# Patient Record
Sex: MALE | Race: WHITE | NOT HISPANIC OR LATINO
[De-identification: names, ages, dates, MRNs, and addresses within clinical notes are randomized per-mention and may not be internally consistent; named-entity substitution may affect disease eponyms.]

---

## 2017-07-03 PROBLEM — Z00.00 ENCOUNTER FOR PREVENTIVE HEALTH EXAMINATION: Status: ACTIVE | Noted: 2017-07-03

## 2017-07-06 ENCOUNTER — APPOINTMENT (OUTPATIENT)
Dept: NEUROSURGERY | Facility: CLINIC | Age: 52
End: 2017-07-06

## 2017-07-06 VITALS
BODY MASS INDEX: 27.2 KG/M2 | DIASTOLIC BLOOD PRESSURE: 60 MMHG | SYSTOLIC BLOOD PRESSURE: 130 MMHG | HEIGHT: 70 IN | WEIGHT: 190 LBS

## 2017-07-06 DIAGNOSIS — M21.371 FOOT DROP, RIGHT FOOT: ICD-10-CM

## 2017-07-06 DIAGNOSIS — M51.36 OTHER INTERVERTEBRAL DISC DEGENERATION, LUMBAR REGION: ICD-10-CM

## 2018-02-14 ENCOUNTER — OUTPATIENT (OUTPATIENT)
Dept: OUTPATIENT SERVICES | Facility: HOSPITAL | Age: 53
LOS: 1 days | End: 2018-02-14

## 2018-04-04 ENCOUNTER — OUTPATIENT (OUTPATIENT)
Dept: OUTPATIENT SERVICES | Facility: HOSPITAL | Age: 53
LOS: 1 days | End: 2018-04-04

## 2018-10-30 ENCOUNTER — OUTPATIENT (OUTPATIENT)
Dept: OUTPATIENT SERVICES | Facility: HOSPITAL | Age: 53
LOS: 1 days | End: 2018-10-30

## 2020-03-20 ENCOUNTER — APPOINTMENT (OUTPATIENT)
Dept: ULTRASOUND IMAGING | Facility: CLINIC | Age: 55
End: 2020-03-20
Payer: COMMERCIAL

## 2020-03-20 PROCEDURE — 76770 US EXAM ABDO BACK WALL COMP: CPT

## 2020-06-17 ENCOUNTER — OUTPATIENT (OUTPATIENT)
Dept: OUTPATIENT SERVICES | Facility: HOSPITAL | Age: 55
LOS: 1 days | End: 2020-06-17

## 2020-06-21 ENCOUNTER — APPOINTMENT (OUTPATIENT)
Dept: DISASTER EMERGENCY | Facility: CLINIC | Age: 55
End: 2020-06-21

## 2020-06-21 DIAGNOSIS — Z01.818 ENCOUNTER FOR OTHER PREPROCEDURAL EXAMINATION: ICD-10-CM

## 2020-06-22 ENCOUNTER — APPOINTMENT (OUTPATIENT)
Dept: DISASTER EMERGENCY | Facility: CLINIC | Age: 55
End: 2020-06-22

## 2020-06-23 LAB — SARS-COV-2 N GENE NPH QL NAA+PROBE: NOT DETECTED

## 2020-06-24 ENCOUNTER — OUTPATIENT (OUTPATIENT)
Dept: OUTPATIENT SERVICES | Facility: HOSPITAL | Age: 55
LOS: 1 days | End: 2020-06-24

## 2021-03-15 ENCOUNTER — APPOINTMENT (OUTPATIENT)
Dept: MRI IMAGING | Facility: HOSPITAL | Age: 56
End: 2021-03-15

## 2021-04-12 ENCOUNTER — APPOINTMENT (OUTPATIENT)
Dept: ULTRASOUND IMAGING | Facility: CLINIC | Age: 56
End: 2021-04-12
Payer: COMMERCIAL

## 2021-04-12 ENCOUNTER — APPOINTMENT (OUTPATIENT)
Dept: MRI IMAGING | Facility: CLINIC | Age: 56
End: 2021-04-12
Payer: COMMERCIAL

## 2021-04-12 ENCOUNTER — OUTPATIENT (OUTPATIENT)
Dept: OUTPATIENT SERVICES | Facility: HOSPITAL | Age: 56
LOS: 1 days | End: 2021-04-12

## 2021-04-12 DIAGNOSIS — M16.11 UNILATERAL PRIMARY OSTEOARTHRITIS, RIGHT HIP: ICD-10-CM

## 2021-04-12 PROCEDURE — 76942 ECHO GUIDE FOR BIOPSY: CPT | Mod: 26

## 2021-04-12 PROCEDURE — 73722 MRI JOINT OF LWR EXTR W/DYE: CPT | Mod: 26,RT

## 2021-04-12 PROCEDURE — 27093 INJECTION FOR HIP X-RAY: CPT | Mod: RT

## 2021-06-08 ENCOUNTER — OUTPATIENT (OUTPATIENT)
Dept: OUTPATIENT SERVICES | Facility: HOSPITAL | Age: 56
LOS: 1 days | Discharge: HOME | End: 2021-06-08
Payer: COMMERCIAL

## 2021-06-08 DIAGNOSIS — S73.191A OTHER SPRAIN OF RIGHT HIP, INITIAL ENCOUNTER: ICD-10-CM

## 2021-06-08 DIAGNOSIS — Z98.890 OTHER SPECIFIED POSTPROCEDURAL STATES: Chronic | ICD-10-CM

## 2021-06-08 DIAGNOSIS — Z87.81 PERSONAL HISTORY OF (HEALED) TRAUMATIC FRACTURE: Chronic | ICD-10-CM

## 2021-06-08 DIAGNOSIS — Z01.818 ENCOUNTER FOR OTHER PREPROCEDURAL EXAMINATION: ICD-10-CM

## 2021-06-08 LAB
ANION GAP SERPL CALC-SCNC: 6 MMOL/L — SIGNIFICANT CHANGE UP (ref 5–17)
BUN SERPL-MCNC: 16 MG/DL — SIGNIFICANT CHANGE UP (ref 7–23)
CALCIUM SERPL-MCNC: 9 MG/DL — SIGNIFICANT CHANGE UP (ref 8.5–10.1)
CHLORIDE SERPL-SCNC: 108 MMOL/L — SIGNIFICANT CHANGE UP (ref 96–108)
CO2 SERPL-SCNC: 26 MMOL/L — SIGNIFICANT CHANGE UP (ref 22–31)
CREAT SERPL-MCNC: 0.83 MG/DL — SIGNIFICANT CHANGE UP (ref 0.5–1.3)
GLUCOSE SERPL-MCNC: 111 MG/DL — HIGH (ref 70–99)
HCT VFR BLD CALC: 42.8 % — SIGNIFICANT CHANGE UP (ref 39–50)
HGB BLD-MCNC: 14.2 G/DL — SIGNIFICANT CHANGE UP (ref 13–17)
MCHC RBC-ENTMCNC: 30.6 PG — SIGNIFICANT CHANGE UP (ref 27–34)
MCHC RBC-ENTMCNC: 33.2 GM/DL — SIGNIFICANT CHANGE UP (ref 32–36)
MCV RBC AUTO: 92.2 FL — SIGNIFICANT CHANGE UP (ref 80–100)
NRBC # BLD: 0 /100 WBCS — SIGNIFICANT CHANGE UP (ref 0–0)
PLATELET # BLD AUTO: 452 K/UL — HIGH (ref 150–400)
POTASSIUM SERPL-MCNC: 4.4 MMOL/L — SIGNIFICANT CHANGE UP (ref 3.5–5.3)
POTASSIUM SERPL-SCNC: 4.4 MMOL/L — SIGNIFICANT CHANGE UP (ref 3.5–5.3)
RBC # BLD: 4.64 M/UL — SIGNIFICANT CHANGE UP (ref 4.2–5.8)
RBC # FLD: 13.1 % — SIGNIFICANT CHANGE UP (ref 10.3–14.5)
SODIUM SERPL-SCNC: 140 MMOL/L — SIGNIFICANT CHANGE UP (ref 135–145)
WBC # BLD: 7.14 K/UL — SIGNIFICANT CHANGE UP (ref 3.8–10.5)
WBC # FLD AUTO: 7.14 K/UL — SIGNIFICANT CHANGE UP (ref 3.8–10.5)

## 2021-06-08 PROCEDURE — 93010 ELECTROCARDIOGRAM REPORT: CPT

## 2021-06-08 NOTE — H&P PST ADULT - NSICDXPROBLEM_GEN_ALL_CORE_FT
PROBLEM DIAGNOSES  Problem: Other sprain of right hip, initial encounter  Assessment and Plan: RIght hip arthrosocpy with labral repair vs reconstruction with image

## 2021-06-08 NOTE — H&P PST ADULT - HISTORY OF PRESENT ILLNESS
55 year old male with a past medical history of ulcerative colitis GERD, UBALDO (on CPAP), and chronic back limited ROM and pain to right groin and hip.  He is scheduled for a right hip arthroscopy with labral repair vs reconstruction with image on 6/17/2021.    He denies fever, cough, SOB, recent travels and sick contacts.

## 2021-06-08 NOTE — H&P PST ADULT - ASSESSMENT
55 year old male with a past medical history of ulcerative colitis GERD, UBALDO (on CPAP), and chronic back limited ROM and pain to right groin and hip.  He is scheduled for a right hip arthroscopy with labral repair vs reconstruction with image on 2021.    CAPRINI SCORE [CLOT]    AGE RELATED RISK FACTORS                                                       MOBILITY RELATED FACTORS  [x ] Age 41-60 years                                            (1 Point)                  [ ] Bed rest                                                        (1 Point)  [ ] Age: 61-74 years                                           (2 Points)                 [ ] Plaster cast                                                   (2 Points)  [ ] Age= 75 years                                              (3 Points)                 [ ] Bed bound for more than 72 hours                 (2 Points)    DISEASE RELATED RISK FACTORS                                               GENDER SPECIFIC FACTORS  [ ] Edema in the lower extremities                       (1 Point)                  [ ] Pregnancy                                                     (1 Point)  [ ] Varicose veins                                               (1 Point)                  [ ] Post-partum < 6 weeks                                   (1 Point)             [ x] BMI > 25 Kg/m2                                            (1 Point)                  [ ] Hormonal therapy  or oral contraception          (1 Point)                 [ ] Sepsis (in the previous month)                        (1 Point)                  [ ] History of pregnancy complications                 (1 point)  [ ] Pneumonia or serious lung disease                                               [ ] Unexplained or recurrent                     (1 Point)           (in the previous month)                               (1 Point)  [ ] Abnormal pulmonary function test                     (1 Point)                 SURGERY RELATED RISK FACTORS  [ ] Acute myocardial infarction                              (1 Point)                 [ ]  Section                                             (1 Point)  [ ] Congestive heart failure (in the previous month)  (1 Point)               [ ] Minor surgery                                                  (1 Point)   [ ] Inflammatory bowel disease                             (1 Point)                 [ ] Arthroscopic surgery                                        (2 Points)  [ ] Central venous access                                      (2 Points)                [x ] General surgery lasting more than 45 minutes   (2 Points)       [ ] Stroke (in the previous month)                          (5 Points)               [ ] Elective arthroplasty                                         (5 Points)                                                                                                                                               HEMATOLOGY RELATED FACTORS                                                 TRAUMA RELATED RISK FACTORS  [ ] Prior episodes of VTE                                     (3 Points)                [ ] Fracture of the hip, pelvis, or leg                       (5 Points)  [ ] Positive family history for VTE                         (3 Points)                 [ ] Acute spinal cord injury (in the previous month)  (5 Points)  [ ] Prothrombin 28584 A                                     (3 Points)                 [ ] Paralysis  (less than 1 month)                             (5 Points)  [ ] Factor V Leiden                                             (3 Points)                  [ ] Multiple Trauma within 1 month                        (5 Points)  [ ] Lupus anticoagulants                                     (3 Points)                                                           [ ] Anticardiolipin antibodies                               (3 Points)                                                       [ ] High homocysteine in the blood                      (3 Points)                                             [ ] Other congenital or acquired thrombophilia      (3 Points)                                                [ ] Heparin induced thrombocytopenia                  (3 Points)                                          Total Score [     4     ]    Caprini Score 0 - 2:  Low Risk, No VTE Prophylaxis required for most patients, encourage ambulation  Caprini Score 3 - 6:  At Risk, pharmacologic VTE prophylaxis is indicated for most patients (in the absence of a contraindication)  Caprini Score Greater than or = 7:  High Risk, pharmacologic VTE prophylaxis is indicated for most patients (in the absence of a contraindication)

## 2021-06-08 NOTE — H&P PST ADULT - NSICDXPASTMEDICALHX_GEN_ALL_CORE_FT
PAST MEDICAL HISTORY:  Chronic back pain     GERD (gastroesophageal reflux disease)     UBALDO on CPAP     Ulcerative colitis

## 2021-06-14 ENCOUNTER — APPOINTMENT (OUTPATIENT)
Dept: DISASTER EMERGENCY | Facility: CLINIC | Age: 56
End: 2021-06-14

## 2021-06-23 ENCOUNTER — TRANSCRIPTION ENCOUNTER (OUTPATIENT)
Age: 56
End: 2021-06-23

## 2021-06-24 ENCOUNTER — OUTPATIENT (OUTPATIENT)
Dept: OUTPATIENT SERVICES | Facility: HOSPITAL | Age: 56
LOS: 1 days | Discharge: HOME | End: 2021-06-24

## 2021-06-24 VITALS
RESPIRATION RATE: 17 BRPM | DIASTOLIC BLOOD PRESSURE: 87 MMHG | OXYGEN SATURATION: 98 % | TEMPERATURE: 98 F | WEIGHT: 205.03 LBS | HEIGHT: 70 IN | HEART RATE: 74 BPM | SYSTOLIC BLOOD PRESSURE: 142 MMHG

## 2021-06-24 VITALS
RESPIRATION RATE: 20 BRPM | OXYGEN SATURATION: 97 % | SYSTOLIC BLOOD PRESSURE: 149 MMHG | DIASTOLIC BLOOD PRESSURE: 89 MMHG | HEART RATE: 95 BPM

## 2021-06-24 DIAGNOSIS — Z98.890 OTHER SPECIFIED POSTPROCEDURAL STATES: Chronic | ICD-10-CM

## 2021-06-24 DIAGNOSIS — Z87.81 PERSONAL HISTORY OF (HEALED) TRAUMATIC FRACTURE: Chronic | ICD-10-CM

## 2021-06-24 RX ORDER — TRAMADOL HYDROCHLORIDE 50 MG/1
1 TABLET ORAL
Qty: 0 | Refills: 0 | DISCHARGE

## 2021-06-24 RX ORDER — HYDROMORPHONE HYDROCHLORIDE 2 MG/ML
0.5 INJECTION INTRAMUSCULAR; INTRAVENOUS; SUBCUTANEOUS
Refills: 0 | Status: DISCONTINUED | OUTPATIENT
Start: 2021-06-24 | End: 2021-06-24

## 2021-06-24 RX ORDER — TIZANIDINE 4 MG/1
0 TABLET ORAL
Qty: 0 | Refills: 0 | DISCHARGE

## 2021-06-24 RX ORDER — METOCLOPRAMIDE HCL 10 MG
10 TABLET ORAL ONCE
Refills: 0 | Status: DISCONTINUED | OUTPATIENT
Start: 2021-06-24 | End: 2021-06-24

## 2021-06-24 RX ORDER — MESALAMINE 400 MG
2 TABLET, DELAYED RELEASE (ENTERIC COATED) ORAL
Qty: 0 | Refills: 0 | DISCHARGE

## 2021-06-24 RX ORDER — FENTANYL CITRATE 50 UG/ML
25 INJECTION INTRAVENOUS
Refills: 0 | Status: DISCONTINUED | OUTPATIENT
Start: 2021-06-24 | End: 2021-06-24

## 2021-06-24 RX ORDER — SODIUM CHLORIDE 9 MG/ML
1000 INJECTION, SOLUTION INTRAVENOUS
Refills: 0 | Status: DISCONTINUED | OUTPATIENT
Start: 2021-06-24 | End: 2021-06-24

## 2021-06-24 RX ORDER — KETOROLAC TROMETHAMINE 30 MG/ML
30 SYRINGE (ML) INJECTION ONCE
Refills: 0 | Status: DISCONTINUED | OUTPATIENT
Start: 2021-06-24 | End: 2021-06-24

## 2021-06-24 RX ORDER — SODIUM CHLORIDE 9 MG/ML
3 INJECTION INTRAMUSCULAR; INTRAVENOUS; SUBCUTANEOUS EVERY 8 HOURS
Refills: 0 | Status: DISCONTINUED | OUTPATIENT
Start: 2021-06-24 | End: 2021-06-24

## 2021-06-24 RX ORDER — OMEPRAZOLE 10 MG/1
1 CAPSULE, DELAYED RELEASE ORAL
Qty: 0 | Refills: 0 | DISCHARGE

## 2021-06-24 RX ADMIN — HYDROMORPHONE HYDROCHLORIDE 0.5 MILLIGRAM(S): 2 INJECTION INTRAMUSCULAR; INTRAVENOUS; SUBCUTANEOUS at 10:59

## 2021-06-24 RX ADMIN — Medication 30 MILLIGRAM(S): at 10:40

## 2021-06-24 RX ADMIN — SODIUM CHLORIDE 100 MILLILITER(S): 9 INJECTION, SOLUTION INTRAVENOUS at 10:40

## 2021-06-24 RX ADMIN — SODIUM CHLORIDE 3 MILLILITER(S): 9 INJECTION INTRAMUSCULAR; INTRAVENOUS; SUBCUTANEOUS at 07:33

## 2021-06-24 RX ADMIN — HYDROMORPHONE HYDROCHLORIDE 0.5 MILLIGRAM(S): 2 INJECTION INTRAMUSCULAR; INTRAVENOUS; SUBCUTANEOUS at 12:21

## 2021-06-24 RX ADMIN — HYDROMORPHONE HYDROCHLORIDE 0.5 MILLIGRAM(S): 2 INJECTION INTRAMUSCULAR; INTRAVENOUS; SUBCUTANEOUS at 10:57

## 2021-06-24 RX ADMIN — Medication 30 MILLIGRAM(S): at 12:21

## 2021-06-24 RX ADMIN — HYDROMORPHONE HYDROCHLORIDE 0.5 MILLIGRAM(S): 2 INJECTION INTRAMUSCULAR; INTRAVENOUS; SUBCUTANEOUS at 10:40

## 2021-06-24 NOTE — ASU DISCHARGE PLAN (ADULT/PEDIATRIC) - ASU DC SPECIAL INSTRUCTIONSFT
Flat foot weight bearing with crutches.  Begin PT.  Remove dressings in 48 hours, shower then pat dry and apply band aids.  No baths.  Follow up with Dr. Collins in 7-10 days.

## 2021-06-24 NOTE — ASU PREOP CHECKLIST - BMI (KG/M2)
Anesthesia ROS/Med Hx    Overall Review:  Pts. EKG was reviewed, Pts.echo was reviewed and Pts.stress test was reviewed       Anesthesia Plan     ASA Status: 3  Anesthesia Type: General  Induction: Intravenous  Preferred Airway Type: ETT  Reviewed: Lab Results, Pre-Induction Reassessment, Medications, EKG, Patient Summary, Problem List, Chest X-Rays, Beta Blocker Status, Past Med History, NPO Status and Allergies  The proposed anesthetic plan, including its risks and benefits, have been discussed with the Patient - along with the risks and benefits of alternatives.  Questions were encouraged and answered and the patient and/or representative agrees to proceed.  Blood Products: Not Anticipated      Physical Exam  Mallampati: II  TM Distance: >3 FB  Neck ROM: Full  cardiovascular exam normal  pulmonary exam normal  Patient Demonstrates:  Obese                  
29.4

## 2021-06-24 NOTE — ASU PATIENT PROFILE, ADULT - PMH
Chronic back pain    GERD (gastroesophageal reflux disease)    UBALDO on CPAP    Ulcerative colitis

## 2021-06-29 DIAGNOSIS — G47.33 OBSTRUCTIVE SLEEP APNEA (ADULT) (PEDIATRIC): ICD-10-CM

## 2021-06-29 DIAGNOSIS — M24.151 OTHER ARTICULAR CARTILAGE DISORDERS, RIGHT HIP: ICD-10-CM

## 2021-06-29 DIAGNOSIS — F43.9 REACTION TO SEVERE STRESS, UNSPECIFIED: ICD-10-CM

## 2021-06-29 DIAGNOSIS — M25.851 OTHER SPECIFIED JOINT DISORDERS, RIGHT HIP: ICD-10-CM

## 2021-06-29 DIAGNOSIS — E78.5 HYPERLIPIDEMIA, UNSPECIFIED: ICD-10-CM

## 2022-03-27 PROBLEM — K51.90 ULCERATIVE COLITIS, UNSPECIFIED, WITHOUT COMPLICATIONS: Chronic | Status: ACTIVE | Noted: 2021-06-08

## 2022-03-27 PROBLEM — K21.9 GASTRO-ESOPHAGEAL REFLUX DISEASE WITHOUT ESOPHAGITIS: Chronic | Status: ACTIVE | Noted: 2021-06-08

## 2022-03-27 PROBLEM — G47.33 OBSTRUCTIVE SLEEP APNEA (ADULT) (PEDIATRIC): Chronic | Status: ACTIVE | Noted: 2021-06-08

## 2022-03-27 PROBLEM — M54.9 DORSALGIA, UNSPECIFIED: Chronic | Status: ACTIVE | Noted: 2021-06-08

## 2022-05-02 ENCOUNTER — APPOINTMENT (OUTPATIENT)
Dept: ORTHOPEDIC SURGERY | Facility: CLINIC | Age: 57
End: 2022-05-02
Payer: COMMERCIAL

## 2022-05-02 VITALS — HEIGHT: 70 IN | BODY MASS INDEX: 27.2 KG/M2 | WEIGHT: 190 LBS

## 2022-05-02 DIAGNOSIS — Z78.9 OTHER SPECIFIED HEALTH STATUS: ICD-10-CM

## 2022-05-02 PROCEDURE — 99214 OFFICE O/P EST MOD 30 MIN: CPT

## 2022-05-02 NOTE — ASSESSMENT
[FreeTextEntry1] : Previous doc:\par Mild OA right hip with large labral tear. 2 days relief from hip inj by pain management and has done long course of PT and NSAIDs. MRA right hip eval severity of OA and see if he is a hip arthroscopy candidate.\par 5/7/21: MRA reviewed - large labral tear, min OA. Concerned about possible Degen quality of labral tissue. Discussed options - he feels the pain is significant enough daily that he cannot live this way and would like to plan for right hip arthroscopy for repair vs recon. Also discussed VENU but not enough OA for this and I feel he could benefit from arthroscopy - he understands chances of failure and may ultimately still need VENU in the future.\par 7/12/21: I was able to do labral repair as well as a small femoralplasty as he had some mild degeneration - Overall he looks good and is progressing at this time\par 8/16 doing well mild pain - doing alto and has apin but still some weakness in the flexors will start with Increasing PT -\par 9/20 doing well less pain - finished up PT - cont with HEP - mild groin pain but min impingement findings\par 11/15/21: 4 months postop min pain - cont HEP, gradual return to activity.\par \par 5/2/22: Recurrence of pain after jogging - no obvious recurrent tear seen on MRI, has mild OA.  Recc hip inj and then restart PT.

## 2022-05-02 NOTE — DISCUSSION/SUMMARY
[de-identified] : We discussed hip injection and its diagnostic and therapeutic benefits.  In the operating room I will inject a combination of Depomedrol and Marcaine.  The patient will keep a log of their pain relief after the injection.  We will discuss the benefit at a one to two weeks follow-up.  Patient made aware that they may be referred to pain management for this injection\par

## 2022-05-02 NOTE — HISTORY OF PRESENT ILLNESS
[Gradual] : gradual [3] : 3 [2] : 2 [Dull/Aching] : dull/aching [Full time] : Work status: full time [de-identified] : 5/2/22: Worsening pain after trying to jog in March, was doing well prior to this.  Groin pain similar to preop.  Had new MRI done ordered by pain management.  No relief with celebrex.\par \par Previous doc:\par Right > left groin pain x 1 year. Found to have labral tear and inguinal hernia - had hernia repair in June 2020 without relief of groin pain. No improvement with PT and NSAIDs. Now left hip pain is worsening lately but right is worse. Takes tramadol for chronic back pain. Hip inj by pain management gave 2 days of good relief.\par 5/7/21: F/U MRA right hip. Cont groin pain.\par 7/12/21: 2.5 weeks s/p left hip scope. Patient is doing well and this time - Patient is doing well and has seen some progress- Pain is minimal at this time\par 8/16/21 7 wks s/p right hip scope - he is better than prior ot surgery - doing PT happy feel he cannot push it to far at this point \par 9/20/21: 3 months s/p right hip arthroscope. Taking tylneol as needed. Request renewel on Celebrex as otc nsaids are infective at prevent abdominal pain. PT report groin to posterior buttock pain with sudden motion.\par 11/15/21 ~4 months s/p right hip arthroscopy, progressing since last visit. Completed PT. [] : no [FreeTextEntry1] : right hip  [FreeTextEntry5] : Patient started jogging for about 3-4 days and started feeling pain on the right hip  [de-identified] : activity [de-identified] : None

## 2022-05-27 ENCOUNTER — APPOINTMENT (OUTPATIENT)
Dept: PAIN MANAGEMENT | Facility: CLINIC | Age: 57
End: 2022-05-27

## 2022-05-27 PROCEDURE — 27093 INJECTION FOR HIP X-RAY: CPT | Mod: RT

## 2022-05-27 PROCEDURE — J3490M: CUSTOM

## 2022-05-27 NOTE — PROCEDURE
[FreeTextEntry3] : Date of Service: 05/27/2022 \par \par Account: [id number]\par \par Patient: SCHUYLER RAMON \par \par YOB: 1965\par \par Age: 56 year\par \par \par Surgeon: Tyra Acuna M.D.\par \par Pre-Operative Diagnosis: Unilateral Primary Osteoarthritis , Right Hip (M16.11)\par \par Post Operative Diagnosis: Unilateral Primary Osteoarthritis , Right Hip (M16.11)\par \par Procedure: Right Hip arthrogram and steroid injection under fluoroscopic  guidance  \par \par                                                       \par This procedure was carried out using fluoroscopic guidance.  The risks and benefits of the procedure were discussed extensively with the patient.  The consent of the patient was obtained and the following procedure was performed.\par \par The patient was placed in the supine position with right hip flexed and externally rotated 25 degrees.  The area of the right groin was prepped and draped in a sterile fashion.  The fluoroscopic image intensifier was then positioned so that the right hip appeared in view, and the midline intertrochanteric region was identified and marked. The skin and subcutaneous structures were then anesthetized using 1 cc of 1% lidocaine.  A 22 gauge spinal needle was then inserted and directed into this right hip intra-capsular region.  After negative aspiration for heme and CSF,  3 cc of Prohance was injected and appeared to fill the joint  margins. \par \par Right hip arthrogram showed no intravascular flow, and good spread around the femoral head and to the acetabulum. An injectate of 3cc 0.25% marcaine plus 80 mg of depo-medrol was then injected into the right hip space.\par \par The needle was then removed and pressure was applied.  Anesthesia personnel were present throughout the procedure. The patient was instructed to apply ice over the injection site for twenty minutes every two hours for the next 24 to 48 hours.  The patient was also instructed to contact me immediately if there were any problems.\par \par Tyra Acuna M.D.\par

## 2022-06-21 ENCOUNTER — APPOINTMENT (OUTPATIENT)
Dept: ORTHOPEDIC SURGERY | Facility: CLINIC | Age: 57
End: 2022-06-21
Payer: COMMERCIAL

## 2022-06-21 VITALS — WEIGHT: 190 LBS | HEIGHT: 70 IN | BODY MASS INDEX: 27.2 KG/M2

## 2022-06-21 PROCEDURE — 99214 OFFICE O/P EST MOD 30 MIN: CPT

## 2022-06-21 NOTE — ASSESSMENT
[FreeTextEntry1] : Previous doc:\par Mild OA right hip with large labral tear. 2 days relief from hip inj by pain management and has done long course of PT and NSAIDs. MRA right hip eval severity of OA and see if he is a hip arthroscopy candidate.\par 5/7/21: MRA reviewed - large labral tear, min OA. Concerned about possible Degen quality of labral tissue. Discussed options - he feels the pain is significant enough daily that he cannot live this way and would like to plan for right hip arthroscopy for repair vs recon. Also discussed VENU but not enough OA for this and I feel he could benefit from arthroscopy - he understands chances of failure and may ultimately still need VENU in the future.\par 7/12/21: I was able to do labral repair as well as a small femoralplasty as he had some mild degeneration - Overall he looks good and is progressing at this time\par 8/16 doing well mild pain - doing alto and has apin but still some weakness in the flexors will start with Increasing PT -\par 9/20 doing well less pain - finished up PT - cont with HEP - mild groin pain but min impingement findings\par 11/15/21: 4 months postop min pain - cont HEP, gradual return to activity.\par 5/2/22: Recurrence of pain after jogging - no obvious recurrent tear seen on MRI, has mild OA.  Recc hip inj and then restart PT.\par \par 6/21/22: Cont groin pain with activity - he does not feel he can go on like this.  Discussed revision scope vs VENU - will get MRA right hip to better eval severity of degenerative changes.

## 2022-06-21 NOTE — HISTORY OF PRESENT ILLNESS
[5] : 5 [2] : 2 [Dull/Aching] : dull/aching [Sleep] : sleep [Nothing helps with pain getting better] : Nothing helps with pain getting better [de-identified] : 6/21/22: Hip inj did not help - felt worse for a few days then back to baseline.  PT not helping.\par \par Previous doc:\par Right > left groin pain x 1 year. Found to have labral tear and inguinal hernia - had hernia repair in June 2020 without relief of groin pain. No improvement with PT and NSAIDs. Now left hip pain is worsening lately but right is worse. Takes tramadol for chronic back pain. Hip inj by pain management gave 2 days of good relief.\par 5/7/21: F/U MRA right hip. Cont groin pain.\par 7/12/21: 2.5 weeks s/p left hip scope. Patient is doing well and this time - Patient is doing well and has seen some progress- Pain is minimal at this time\par 8/16/21 7 wks s/p right hip scope - he is better than prior ot surgery - doing PT happy feel he cannot push it to far at this point \par 9/20/21: 3 months s/p right hip arthroscope. Taking tylneol as needed. Request renewel on Celebrex as otc nsaids are infective at prevent abdominal pain. PT report groin to posterior buttock pain with sudden motion.\par 11/15/21 ~4 months s/p right hip arthroscopy, progressing since last visit. Completed PT.\par 5/2/22: Worsening pain after trying to jog in March, was doing well prior to this.  Groin pain similar to preop.  Had new MRI done ordered by pain management.  No relief with celebrex. [] : no [FreeTextEntry1] : right hip [FreeTextEntry5] : pt is here to follow up on his right hip.pt states that he also has pain on his right knee and lower back [de-identified] : motion

## 2022-06-21 NOTE — DISCUSSION/SUMMARY
[de-identified] : The patient was advised of the diagnosis.  The natural history of the pathology was explained in full to the patient in layman's terms. All questions were answered.  The risks and benefits of surgical and non-surgical treatment alternatives were explained in full to the patient.\par

## 2022-07-25 ENCOUNTER — APPOINTMENT (OUTPATIENT)
Dept: MRI IMAGING | Facility: CLINIC | Age: 57
End: 2022-07-25

## 2022-07-25 ENCOUNTER — APPOINTMENT (OUTPATIENT)
Dept: INTERVENTIONAL RADIOLOGY/VASCULAR | Facility: CLINIC | Age: 57
End: 2022-07-25

## 2022-07-25 ENCOUNTER — RESULT REVIEW (OUTPATIENT)
Age: 57
End: 2022-07-25

## 2022-07-25 ENCOUNTER — OUTPATIENT (OUTPATIENT)
Dept: OUTPATIENT SERVICES | Facility: HOSPITAL | Age: 57
LOS: 1 days | End: 2022-07-25

## 2022-07-25 DIAGNOSIS — Z98.890 OTHER SPECIFIED POSTPROCEDURAL STATES: Chronic | ICD-10-CM

## 2022-07-25 DIAGNOSIS — Z87.81 PERSONAL HISTORY OF (HEALED) TRAUMATIC FRACTURE: Chronic | ICD-10-CM

## 2022-07-25 DIAGNOSIS — S73.191D OTHER SPRAIN OF RIGHT HIP, SUBSEQUENT ENCOUNTER: ICD-10-CM

## 2022-07-25 PROCEDURE — 27093 INJECTION FOR HIP X-RAY: CPT | Mod: RT

## 2022-07-25 PROCEDURE — 73722 MRI JOINT OF LWR EXTR W/DYE: CPT | Mod: 26,RT

## 2022-07-25 PROCEDURE — 77002 NEEDLE LOCALIZATION BY XRAY: CPT | Mod: 26

## 2022-08-01 ENCOUNTER — APPOINTMENT (OUTPATIENT)
Dept: ORTHOPEDIC SURGERY | Facility: CLINIC | Age: 57
End: 2022-08-01

## 2022-08-01 ENCOUNTER — RESULT CHARGE (OUTPATIENT)
Age: 57
End: 2022-08-01

## 2022-08-01 VITALS — WEIGHT: 210 LBS | BODY MASS INDEX: 30.06 KG/M2 | HEIGHT: 70 IN

## 2022-08-01 PROCEDURE — 73502 X-RAY EXAM HIP UNI 2-3 VIEWS: CPT | Mod: RT

## 2022-08-01 PROCEDURE — 99214 OFFICE O/P EST MOD 30 MIN: CPT

## 2022-08-01 NOTE — HISTORY OF PRESENT ILLNESS
[4] : 4 [2] : 2 [] : yes [Full time] : Work status: full time [de-identified] : 8/1/22: F/U MRA right hip.  Cont groin pain.\par \par Previous doc:\par Right > left groin pain x 1 year. Found to have labral tear and inguinal hernia - had hernia repair in June 2020 without relief of groin pain. No improvement with PT and NSAIDs. Now left hip pain is worsening lately but right is worse. Takes tramadol for chronic back pain. Hip inj by pain management gave 2 days of good relief.\par 5/7/21: F/U MRA right hip. Cont groin pain.\par 7/12/21: 2.5 weeks s/p left hip scope. Patient is doing well and this time - Patient is doing well and has seen some progress- Pain is minimal at this time\par 8/16/21 7 wks s/p right hip scope - he is better than prior ot surgery - doing PT happy feel he cannot push it to far at this point \par 9/20/21: 3 months s/p right hip arthroscope. Taking tylneol as needed. Request renewel on Celebrex as otc nsaids are infective at prevent abdominal pain. PT report groin to posterior buttock pain with sudden motion.\par 11/15/21 ~4 months s/p right hip arthroscopy, progressing since last visit. Completed PT.\par 5/2/22: Worsening pain after trying to jog in March, was doing well prior to this.  Groin pain similar to preop.  Had new MRI done ordered by pain management.  No relief with celebrex.\par 6/21/22: Hip inj did not help - felt worse for a few days then back to baseline.  PT not helping. [de-identified] : MR arthrogram [de-identified] : went to PT 3 times

## 2022-08-01 NOTE — ASSESSMENT
[FreeTextEntry1] : Previous doc:\par Mild OA right hip with large labral tear. 2 days relief from hip inj by pain management and has done long course of PT and NSAIDs. MRA right hip eval severity of OA and see if he is a hip arthroscopy candidate.\par 5/7/21: MRA reviewed - large labral tear, min OA. Concerned about possible Degen quality of labral tissue. Discussed options - he feels the pain is significant enough daily that he cannot live this way and would like to plan for right hip arthroscopy for repair vs recon. Also discussed VENU but not enough OA for this and I feel he could benefit from arthroscopy - he understands chances of failure and may ultimately still need VENU in the future.\par 7/12/21: I was able to do labral repair as well as a small femoralplasty as he had some mild degeneration - Overall he looks good and is progressing at this time\par 8/16 doing well mild pain - doing alto and has apin but still some weakness in the flexors will start with Increasing PT -\par 9/20 doing well less pain - finished up PT - cont with HEP - mild groin pain but min impingement findings\par 11/15/21: 4 months postop min pain - cont HEP, gradual return to activity.\par 5/2/22: Recurrence of pain after jogging - no obvious recurrent tear seen on MRI, has mild OA.  Recc hip inj and then restart PT.\par 6/21/22: Cont groin pain with activity - he does not feel he can go on like this.  Discussed revision scope vs VENU - will get MRA right hip to better eval severity of degenerative changes.\par \par 8/1/22: MRA showing mild OA, labral degen without recurrent tear.  Offered reconstruction vs VENU - he feels he is in enough pain to proceed with VENU.  Discussed risks and benefits.  Offered Lspine workup as he has some low back pain and hip inj did not help but he states this is chronic and the pain is exactly the same as before labral repair which did well x 1 year.

## 2022-08-01 NOTE — DISCUSSION/SUMMARY
[de-identified] : The natural progression of Osteoarthritis was explained to the patient.  We discussed the possible treatment options from conservative to operative.  These included NSAIDS, Glucosamine and Chondrotin sulfate, and Physical Therapy.  We also discussed that at some point they may progress to needing a VENU.\par \par We discussed my findings and the natural history of their condition.  We talked about the details of the proposed surgery and the recovery.  We discussed the material risks, possible benefits and alternatives to surgery.  The risks include but are not limited to infection, bleeding and possible need for blood transfusion, fracture, bowel blockage, bladder retention or infection, need for reoperation, stiffness and/or limited range of motion, possible damage to nerves and blood vessels, failure of fixation of components, risk of deep vein thromboses and pulmonary embolism, wound healing problems, dislocation, and possible leg length discrepancy.  Although incredibly rare, we also discussed the risks of a cardiac event, stroke and even death during, or following, the surgery.  We discussed the type of implants the patient will be receiving and the type of fixation that will be used, as well as whether a robot or computer navigation aide will be used.  The patient understands they will need medical clearance and will attend a preoperative joint education class.  We also discussed the type of anesthesia they will receive, and the risks associated with hospital or rehab length of stay, obesity, diabetes and smoking.\par

## 2022-10-03 ENCOUNTER — APPOINTMENT (OUTPATIENT)
Dept: ORTHOPEDIC SURGERY | Facility: CLINIC | Age: 57
End: 2022-10-03

## 2022-10-03 VITALS — HEIGHT: 70 IN | WEIGHT: 210 LBS | BODY MASS INDEX: 30.06 KG/M2

## 2022-10-03 DIAGNOSIS — M16.11 UNILATERAL PRIMARY OSTEOARTHRITIS, RIGHT HIP: ICD-10-CM

## 2022-10-03 DIAGNOSIS — S73.191D OTHER SPRAIN OF RIGHT HIP, SUBSEQUENT ENCOUNTER: ICD-10-CM

## 2022-10-03 PROCEDURE — 99213 OFFICE O/P EST LOW 20 MIN: CPT

## 2022-10-03 NOTE — DISCUSSION/SUMMARY
[de-identified] : The patient was advised of the diagnosis.  The natural history of the pathology was explained in full to the patient in layman's terms. All questions were answered.  The risks and benefits of surgical and non-surgical treatment alternatives were explained in full to the patient.\par

## 2022-10-03 NOTE — ASSESSMENT
[FreeTextEntry1] : Previous doc:\par Mild OA right hip with large labral tear. 2 days relief from hip inj by pain management and has done long course of PT and NSAIDs. MRA right hip eval severity of OA and see if he is a hip arthroscopy candidate.\par 5/7/21: MRA reviewed - large labral tear, min OA. Concerned about possible Degen quality of labral tissue. Discussed options - he feels the pain is significant enough daily that he cannot live this way and would like to plan for right hip arthroscopy for repair vs recon. Also discussed VENU but not enough OA for this and I feel he could benefit from arthroscopy - he understands chances of failure and may ultimately still need VENU in the future.\par 7/12/21: I was able to do labral repair as well as a small femoralplasty as he had some mild degeneration - Overall he looks good and is progressing at this time\par 8/16 doing well mild pain - doing alto and has apin but still some weakness in the flexors will start with Increasing PT -\par 9/20 doing well less pain - finished up PT - cont with HEP - mild groin pain but min impingement findings\par 11/15/21: 4 months postop min pain - cont HEP, gradual return to activity.\par 5/2/22: Recurrence of pain after jogging - no obvious recurrent tear seen on MRI, has mild OA.  Recc hip inj and then restart PT.\par 6/21/22: Cont groin pain with activity - he does not feel he can go on like this.  Discussed revision scope vs VENU - will get MRA right hip to better eval severity of degenerative changes.\par 8/1/22: MRA showing mild OA, labral degen without recurrent tear.  Offered reconstruction vs VENU - he feels he is in enough pain to proceed with VENU.  Discussed risks and benefits.  Offered Lspine workup as he has some low back pain and hip inj did not help but he states this is chronic and the pain is exactly the same as before labral repair which did well x 1 year.\par \par 10/3/22: Again discussed labral recon vs VENU - I do not think that recon is the best option for him.  He is scheduled for VENU end of this month and wishes to proceed but needs to delay till January.

## 2022-10-03 NOTE — HISTORY OF PRESENT ILLNESS
[6] : 6 [2] : 2 [Full time] : Work status: full time [de-identified] : 10/3/22: Scheduled for VENU 10/20 but concerned about proceeding.\par \par Previous doc:\par Right > left groin pain x 1 year. Found to have labral tear and inguinal hernia - had hernia repair in June 2020 without relief of groin pain. No improvement with PT and NSAIDs. Now left hip pain is worsening lately but right is worse. Takes tramadol for chronic back pain. Hip inj by pain management gave 2 days of good relief.\par 5/7/21: F/U MRA right hip. Cont groin pain.\par 7/12/21: 2.5 weeks s/p left hip scope. Patient is doing well and this time - Patient is doing well and has seen some progress- Pain is minimal at this time\par 8/16/21 7 wks s/p right hip scope - he is better than prior ot surgery - doing PT happy feel he cannot push it to far at this point \par 9/20/21: 3 months s/p right hip arthroscope. Taking tylneol as needed. Request renewel on Celebrex as otc nsaids are infective at prevent abdominal pain. PT report groin to posterior buttock pain with sudden motion.\par 11/15/21 ~4 months s/p right hip arthroscopy, progressing since last visit. Completed PT.\par 5/2/22: Worsening pain after trying to jog in March, was doing well prior to this.  Groin pain similar to preop.  Had new MRI done ordered by pain management.  No relief with celebrex.\par 6/21/22: Hip inj did not help - felt worse for a few days then back to baseline.  PT not helping.\par 8/1/22: F/U MRA right hip.  Cont groin pain. [] : no [FreeTextEntry1] : Right hip  [de-identified] : medication, HEAT

## 2022-10-04 ENCOUNTER — APPOINTMENT (OUTPATIENT)
Dept: ORTHOPEDIC SURGERY | Facility: CLINIC | Age: 57
End: 2022-10-04

## 2022-10-19 ENCOUNTER — APPOINTMENT (OUTPATIENT)
Dept: ORTHOPEDIC SURGERY | Facility: HOSPITAL | Age: 57
End: 2022-10-19

## 2022-11-07 ENCOUNTER — APPOINTMENT (OUTPATIENT)
Dept: ORTHOPEDIC SURGERY | Facility: CLINIC | Age: 57
End: 2022-11-07

## 2023-06-06 ENCOUNTER — APPOINTMENT (OUTPATIENT)
Dept: CARDIOLOGY | Facility: CLINIC | Age: 58
End: 2023-06-06
Payer: COMMERCIAL

## 2023-06-06 ENCOUNTER — NON-APPOINTMENT (OUTPATIENT)
Age: 58
End: 2023-06-06

## 2023-06-06 VITALS — SYSTOLIC BLOOD PRESSURE: 126 MMHG | DIASTOLIC BLOOD PRESSURE: 80 MMHG

## 2023-06-06 VITALS
BODY MASS INDEX: 30.06 KG/M2 | DIASTOLIC BLOOD PRESSURE: 76 MMHG | HEART RATE: 74 BPM | OXYGEN SATURATION: 97 % | HEIGHT: 70 IN | WEIGHT: 210 LBS | SYSTOLIC BLOOD PRESSURE: 126 MMHG

## 2023-06-06 DIAGNOSIS — Z83.3 FAMILY HISTORY OF DIABETES MELLITUS: ICD-10-CM

## 2023-06-06 DIAGNOSIS — Z82.49 FAMILY HISTORY OF ISCHEMIC HEART DISEASE AND OTHER DISEASES OF THE CIRCULATORY SYSTEM: ICD-10-CM

## 2023-06-06 DIAGNOSIS — Z78.9 OTHER SPECIFIED HEALTH STATUS: ICD-10-CM

## 2023-06-06 DIAGNOSIS — Z87.19 PERSONAL HISTORY OF OTHER DISEASES OF THE DIGESTIVE SYSTEM: ICD-10-CM

## 2023-06-06 DIAGNOSIS — R55 SYNCOPE AND COLLAPSE: ICD-10-CM

## 2023-06-06 PROCEDURE — 93000 ELECTROCARDIOGRAM COMPLETE: CPT

## 2023-06-06 PROCEDURE — 99214 OFFICE O/P EST MOD 30 MIN: CPT | Mod: 25

## 2023-06-06 RX ORDER — OMEPRAZOLE MAGNESIUM 40 MG/1
40 CAPSULE, DELAYED RELEASE ORAL DAILY
Refills: 0 | Status: ACTIVE | COMMUNITY

## 2023-06-06 RX ORDER — METAXALONE 800 MG/1
800 TABLET ORAL 3 TIMES DAILY
Refills: 0 | Status: ACTIVE | COMMUNITY

## 2023-06-06 RX ORDER — MESALAMINE 1.2 G/1
1.2 TABLET, DELAYED RELEASE ORAL DAILY
Refills: 0 | Status: ACTIVE | COMMUNITY

## 2023-06-06 RX ORDER — TIZANIDINE HYDROCHLORIDE 4 MG/1
4 CAPSULE ORAL AT BEDTIME
Refills: 0 | Status: ACTIVE | COMMUNITY

## 2023-06-06 NOTE — HISTORY OF PRESENT ILLNESS
[FreeTextEntry1] : 57 year old male had a syncopal episode after taking Skelaxin and 1 hour after drinking EtOH along with a big meal. He was evaluated at [facility] [place], 5/28/2023 and it was determined his syncope was secondary to combination of Skelaxin, big meal and EtOH.\par \par Patient has had two other syncopal episodes in the past 3 months. One associated with COVID-19 infection and other one was associated with hot tub use. \par \par There is no history of MI, CVA, CHF, or previous coronary intervention.\par

## 2023-06-06 NOTE — DISCUSSION/SUMMARY
[EKG obtained to assist in diagnosis and management of assessed problem(s)] : EKG obtained to assist in diagnosis and management of assessed problem(s) [FreeTextEntry1] : 1. Syncope: each situation had a trigger. Likely suffers from vasovagal, but will check echocardiogram, carotid duplex, and 14 Day Zio. Discussed triggers. Asked patient not to mix EtOH and Skelaxin going forward.\par \par Office will call with results\par \par Follow up in 6 months.

## 2023-06-06 NOTE — PHYSICAL EXAM
[Normal] : moves all extremities, no focal deficits, normal speech [de-identified] : No carotid bruits auscultated bilaterally

## 2023-06-19 ENCOUNTER — NON-APPOINTMENT (OUTPATIENT)
Age: 58
End: 2023-06-19

## 2023-06-23 ENCOUNTER — APPOINTMENT (OUTPATIENT)
Dept: CARDIOLOGY | Facility: CLINIC | Age: 58
End: 2023-06-23
Payer: COMMERCIAL

## 2023-06-23 PROCEDURE — 93880 EXTRACRANIAL BILAT STUDY: CPT

## 2023-06-23 PROCEDURE — 93306 TTE W/DOPPLER COMPLETE: CPT

## 2023-06-26 ENCOUNTER — NON-APPOINTMENT (OUTPATIENT)
Age: 58
End: 2023-06-26

## 2023-12-12 ENCOUNTER — APPOINTMENT (OUTPATIENT)
Dept: CARDIOLOGY | Facility: CLINIC | Age: 58
End: 2023-12-12

## 2024-08-22 NOTE — ASU PATIENT PROFILE, ADULT - PAIN, CHRONIC: FACTORS THAT AGGRAVATE, PROFILE
From: Sarah Triplett  Sent: 8/21/2024 4:48 PM CDT  To: [facility] Clinical Staff  Subject: Meds    Yes I apologize I wanted to come to you. I'm going to have and appt with a pain management but just received a referral today.  
activity/movement

## 2025-07-08 ENCOUNTER — APPOINTMENT (OUTPATIENT)
Dept: MRI IMAGING | Facility: CLINIC | Age: 60
End: 2025-07-08

## 2025-07-08 ENCOUNTER — APPOINTMENT (OUTPATIENT)
Dept: CT IMAGING | Facility: CLINIC | Age: 60
End: 2025-07-08

## 2025-07-09 ENCOUNTER — NON-APPOINTMENT (OUTPATIENT)
Age: 60
End: 2025-07-09